# Patient Record
Sex: FEMALE | Race: WHITE
[De-identification: names, ages, dates, MRNs, and addresses within clinical notes are randomized per-mention and may not be internally consistent; named-entity substitution may affect disease eponyms.]

---

## 2017-06-27 ENCOUNTER — HOSPITAL ENCOUNTER (OUTPATIENT)
Dept: HOSPITAL 45 - C.PAPS | Age: 37
Discharge: HOME | End: 2017-06-27
Attending: OBSTETRICS & GYNECOLOGY
Payer: COMMERCIAL

## 2017-06-27 DIAGNOSIS — Z01.419: Primary | ICD-10-CM

## 2018-07-26 ENCOUNTER — HOSPITAL ENCOUNTER (OUTPATIENT)
Dept: HOSPITAL 45 - C.LAB1850 | Age: 38
Discharge: HOME | End: 2018-07-26
Attending: OBSTETRICS & GYNECOLOGY
Payer: COMMERCIAL

## 2018-07-26 DIAGNOSIS — R53.83: Primary | ICD-10-CM

## 2018-07-26 LAB
EOSINOPHIL NFR BLD AUTO: 206 K/UL (ref 130–400)
HCT VFR BLD CALC: 44 % (ref 37–47)
HGB BLD-MCNC: 14.7 G/DL (ref 12–16)
MCH RBC QN AUTO: 30.8 PG (ref 25–34)
MCHC RBC AUTO-ENTMCNC: 33.4 G/DL (ref 32–36)
MCV RBC AUTO: 92.1 FL (ref 80–100)
PMV BLD AUTO: 11.1 FL (ref 7.4–10.4)
RED CELL DISTRIBUTION WIDTH CV: 13.2 % (ref 11.5–14.5)
RED CELL DISTRIBUTION WIDTH SD: 44.1 FL (ref 36.4–46.3)
WBC # BLD AUTO: 5.84 K/UL (ref 4.8–10.8)

## 2018-08-20 ENCOUNTER — HOSPITAL ENCOUNTER (OUTPATIENT)
Dept: HOSPITAL 45 - C.RDSM | Age: 38
Discharge: HOME | End: 2018-08-20
Attending: PHYSICIAN ASSISTANT
Payer: COMMERCIAL

## 2018-08-20 DIAGNOSIS — M79.671: ICD-10-CM

## 2018-08-20 DIAGNOSIS — M79.672: Primary | ICD-10-CM

## 2018-08-20 NOTE — DIAGNOSTIC IMAGING REPORT
AP STANDING VIEW OF BOTH FEET; 3 VIEWS RIGHT FOOT



CLINICAL HISTORY: Right foot pain.



FINDINGS: An AP standing view of both feet with 3 views of the right foot are

obtained. No prior studies are available for comparison at the time of

dictation. The skeletal structures are well mineralized. No fracture is

identified. The joint spaces of the foot are well-maintained. There is no

radiographic evidence of Lisfranc injury. An os trigonum is incidentally noted.

The overlying soft tissues are within normal limits. The standing view of both

feet shows no abnormality. The left foot is normal as imaged and the feet appear

symmetric.



IMPRESSION: No acute bony abnormality is seen in the right foot.







Electronically signed by:  Delio Kimball M.D.

8/20/2018 12:40 PM



Dictated Date/Time:  8/20/2018 12:38 PM